# Patient Record
Sex: MALE | Race: WHITE | NOT HISPANIC OR LATINO | URBAN - METROPOLITAN AREA
[De-identification: names, ages, dates, MRNs, and addresses within clinical notes are randomized per-mention and may not be internally consistent; named-entity substitution may affect disease eponyms.]

---

## 2024-01-14 ENCOUNTER — EMERGENCY (EMERGENCY)
Facility: HOSPITAL | Age: 24
LOS: 1 days | Discharge: HOME | End: 2024-01-14
Attending: EMERGENCY MEDICINE | Admitting: EMERGENCY MEDICINE
Payer: COMMERCIAL

## 2024-01-14 VITALS — SYSTOLIC BLOOD PRESSURE: 107 MMHG | HEART RATE: 81 BPM | DIASTOLIC BLOOD PRESSURE: 57 MMHG | RESPIRATION RATE: 16 BRPM

## 2024-01-14 VITALS
SYSTOLIC BLOOD PRESSURE: 127 MMHG | OXYGEN SATURATION: 96 % | RESPIRATION RATE: 18 BRPM | TEMPERATURE: 98 F | WEIGHT: 149.91 LBS | DIASTOLIC BLOOD PRESSURE: 74 MMHG | HEIGHT: 71 IN | HEART RATE: 91 BPM

## 2024-01-14 DIAGNOSIS — R53.83 OTHER FATIGUE: ICD-10-CM

## 2024-01-14 DIAGNOSIS — R25.1 TREMOR, UNSPECIFIED: ICD-10-CM

## 2024-01-14 DIAGNOSIS — Z91.013 ALLERGY TO SEAFOOD: ICD-10-CM

## 2024-01-14 DIAGNOSIS — Z91.010 ALLERGY TO PEANUTS: ICD-10-CM

## 2024-01-14 DIAGNOSIS — R07.89 OTHER CHEST PAIN: ICD-10-CM

## 2024-01-14 LAB
ALBUMIN SERPL ELPH-MCNC: 4.7 G/DL — SIGNIFICANT CHANGE UP (ref 3.3–5)
ALBUMIN SERPL ELPH-MCNC: 4.7 G/DL — SIGNIFICANT CHANGE UP (ref 3.3–5)
ALP SERPL-CCNC: 52 U/L — SIGNIFICANT CHANGE UP (ref 40–120)
ALP SERPL-CCNC: 52 U/L — SIGNIFICANT CHANGE UP (ref 40–120)
ALT FLD-CCNC: 45 U/L — SIGNIFICANT CHANGE UP (ref 10–45)
ALT FLD-CCNC: 45 U/L — SIGNIFICANT CHANGE UP (ref 10–45)
ANION GAP SERPL CALC-SCNC: 0 MMOL/L — LOW (ref 5–17)
ANION GAP SERPL CALC-SCNC: 0 MMOL/L — LOW (ref 5–17)
AST SERPL-CCNC: 34 U/L — SIGNIFICANT CHANGE UP (ref 10–40)
AST SERPL-CCNC: 34 U/L — SIGNIFICANT CHANGE UP (ref 10–40)
BASOPHILS # BLD AUTO: 0.05 K/UL — SIGNIFICANT CHANGE UP (ref 0–0.2)
BASOPHILS # BLD AUTO: 0.05 K/UL — SIGNIFICANT CHANGE UP (ref 0–0.2)
BASOPHILS NFR BLD AUTO: 0.5 % — SIGNIFICANT CHANGE UP (ref 0–2)
BASOPHILS NFR BLD AUTO: 0.5 % — SIGNIFICANT CHANGE UP (ref 0–2)
BILIRUB SERPL-MCNC: 0.3 MG/DL — SIGNIFICANT CHANGE UP (ref 0.2–1.2)
BILIRUB SERPL-MCNC: 0.3 MG/DL — SIGNIFICANT CHANGE UP (ref 0.2–1.2)
BUN SERPL-MCNC: 18 MG/DL — SIGNIFICANT CHANGE UP (ref 7–23)
BUN SERPL-MCNC: 18 MG/DL — SIGNIFICANT CHANGE UP (ref 7–23)
CALCIUM SERPL-MCNC: 10.1 MG/DL — SIGNIFICANT CHANGE UP (ref 8.4–10.5)
CALCIUM SERPL-MCNC: 10.1 MG/DL — SIGNIFICANT CHANGE UP (ref 8.4–10.5)
CHLORIDE SERPL-SCNC: 112 MMOL/L — HIGH (ref 96–108)
CHLORIDE SERPL-SCNC: 112 MMOL/L — HIGH (ref 96–108)
CK MB CFR SERPL CALC: 2.2 NG/ML — SIGNIFICANT CHANGE UP (ref 0–6.7)
CK MB CFR SERPL CALC: 2.2 NG/ML — SIGNIFICANT CHANGE UP (ref 0–6.7)
CK SERPL-CCNC: 247 U/L — HIGH (ref 30–200)
CK SERPL-CCNC: 247 U/L — HIGH (ref 30–200)
CO2 SERPL-SCNC: 23 MMOL/L — SIGNIFICANT CHANGE UP (ref 22–31)
CO2 SERPL-SCNC: 23 MMOL/L — SIGNIFICANT CHANGE UP (ref 22–31)
CREAT SERPL-MCNC: 1.05 MG/DL — SIGNIFICANT CHANGE UP (ref 0.5–1.3)
CREAT SERPL-MCNC: 1.05 MG/DL — SIGNIFICANT CHANGE UP (ref 0.5–1.3)
EGFR: 102 ML/MIN/1.73M2 — SIGNIFICANT CHANGE UP
EGFR: 102 ML/MIN/1.73M2 — SIGNIFICANT CHANGE UP
EOSINOPHIL # BLD AUTO: 0.12 K/UL — SIGNIFICANT CHANGE UP (ref 0–0.5)
EOSINOPHIL # BLD AUTO: 0.12 K/UL — SIGNIFICANT CHANGE UP (ref 0–0.5)
EOSINOPHIL NFR BLD AUTO: 1.2 % — SIGNIFICANT CHANGE UP (ref 0–6)
EOSINOPHIL NFR BLD AUTO: 1.2 % — SIGNIFICANT CHANGE UP (ref 0–6)
GLUCOSE SERPL-MCNC: 93 MG/DL — SIGNIFICANT CHANGE UP (ref 70–99)
GLUCOSE SERPL-MCNC: 93 MG/DL — SIGNIFICANT CHANGE UP (ref 70–99)
HCT VFR BLD CALC: 39.6 % — SIGNIFICANT CHANGE UP (ref 39–50)
HCT VFR BLD CALC: 39.6 % — SIGNIFICANT CHANGE UP (ref 39–50)
HGB BLD-MCNC: 13.7 G/DL — SIGNIFICANT CHANGE UP (ref 13–17)
HGB BLD-MCNC: 13.7 G/DL — SIGNIFICANT CHANGE UP (ref 13–17)
IMM GRANULOCYTES NFR BLD AUTO: 0.2 % — SIGNIFICANT CHANGE UP (ref 0–0.9)
IMM GRANULOCYTES NFR BLD AUTO: 0.2 % — SIGNIFICANT CHANGE UP (ref 0–0.9)
LYMPHOCYTES # BLD AUTO: 2.73 K/UL — SIGNIFICANT CHANGE UP (ref 1–3.3)
LYMPHOCYTES # BLD AUTO: 2.73 K/UL — SIGNIFICANT CHANGE UP (ref 1–3.3)
LYMPHOCYTES # BLD AUTO: 26.2 % — SIGNIFICANT CHANGE UP (ref 13–44)
LYMPHOCYTES # BLD AUTO: 26.2 % — SIGNIFICANT CHANGE UP (ref 13–44)
MCHC RBC-ENTMCNC: 30.6 PG — SIGNIFICANT CHANGE UP (ref 27–34)
MCHC RBC-ENTMCNC: 30.6 PG — SIGNIFICANT CHANGE UP (ref 27–34)
MCHC RBC-ENTMCNC: 34.6 GM/DL — SIGNIFICANT CHANGE UP (ref 32–36)
MCHC RBC-ENTMCNC: 34.6 GM/DL — SIGNIFICANT CHANGE UP (ref 32–36)
MCV RBC AUTO: 88.6 FL — SIGNIFICANT CHANGE UP (ref 80–100)
MCV RBC AUTO: 88.6 FL — SIGNIFICANT CHANGE UP (ref 80–100)
MONOCYTES # BLD AUTO: 0.72 K/UL — SIGNIFICANT CHANGE UP (ref 0–0.9)
MONOCYTES # BLD AUTO: 0.72 K/UL — SIGNIFICANT CHANGE UP (ref 0–0.9)
MONOCYTES NFR BLD AUTO: 6.9 % — SIGNIFICANT CHANGE UP (ref 2–14)
MONOCYTES NFR BLD AUTO: 6.9 % — SIGNIFICANT CHANGE UP (ref 2–14)
NEUTROPHILS # BLD AUTO: 6.77 K/UL — SIGNIFICANT CHANGE UP (ref 1.8–7.4)
NEUTROPHILS # BLD AUTO: 6.77 K/UL — SIGNIFICANT CHANGE UP (ref 1.8–7.4)
NEUTROPHILS NFR BLD AUTO: 65 % — SIGNIFICANT CHANGE UP (ref 43–77)
NEUTROPHILS NFR BLD AUTO: 65 % — SIGNIFICANT CHANGE UP (ref 43–77)
NRBC # BLD: 0 /100 WBCS — SIGNIFICANT CHANGE UP (ref 0–0)
NRBC # BLD: 0 /100 WBCS — SIGNIFICANT CHANGE UP (ref 0–0)
PLATELET # BLD AUTO: 226 K/UL — SIGNIFICANT CHANGE UP (ref 150–400)
PLATELET # BLD AUTO: 226 K/UL — SIGNIFICANT CHANGE UP (ref 150–400)
POTASSIUM SERPL-MCNC: 3.8 MMOL/L — SIGNIFICANT CHANGE UP (ref 3.5–5.3)
POTASSIUM SERPL-MCNC: 3.8 MMOL/L — SIGNIFICANT CHANGE UP (ref 3.5–5.3)
POTASSIUM SERPL-SCNC: 3.8 MMOL/L — SIGNIFICANT CHANGE UP (ref 3.5–5.3)
POTASSIUM SERPL-SCNC: 3.8 MMOL/L — SIGNIFICANT CHANGE UP (ref 3.5–5.3)
PROT SERPL-MCNC: 7.2 G/DL — SIGNIFICANT CHANGE UP (ref 6–8.3)
PROT SERPL-MCNC: 7.2 G/DL — SIGNIFICANT CHANGE UP (ref 6–8.3)
RBC # BLD: 4.47 M/UL — SIGNIFICANT CHANGE UP (ref 4.2–5.8)
RBC # BLD: 4.47 M/UL — SIGNIFICANT CHANGE UP (ref 4.2–5.8)
RBC # FLD: 12.1 % — SIGNIFICANT CHANGE UP (ref 10.3–14.5)
RBC # FLD: 12.1 % — SIGNIFICANT CHANGE UP (ref 10.3–14.5)
SODIUM SERPL-SCNC: 135 MMOL/L — SIGNIFICANT CHANGE UP (ref 135–145)
SODIUM SERPL-SCNC: 135 MMOL/L — SIGNIFICANT CHANGE UP (ref 135–145)
TROPONIN T, HIGH SENSITIVITY RESULT: 8 NG/L — SIGNIFICANT CHANGE UP (ref 0–51)
WBC # BLD: 10.41 K/UL — SIGNIFICANT CHANGE UP (ref 3.8–10.5)
WBC # BLD: 10.41 K/UL — SIGNIFICANT CHANGE UP (ref 3.8–10.5)
WBC # FLD AUTO: 10.41 K/UL — SIGNIFICANT CHANGE UP (ref 3.8–10.5)
WBC # FLD AUTO: 10.41 K/UL — SIGNIFICANT CHANGE UP (ref 3.8–10.5)

## 2024-01-14 PROCEDURE — 99285 EMERGENCY DEPT VISIT HI MDM: CPT

## 2024-01-14 PROCEDURE — 99284 EMERGENCY DEPT VISIT MOD MDM: CPT | Mod: 25

## 2024-01-14 PROCEDURE — 82550 ASSAY OF CK (CPK): CPT

## 2024-01-14 PROCEDURE — 84484 ASSAY OF TROPONIN QUANT: CPT

## 2024-01-14 PROCEDURE — 85025 COMPLETE CBC W/AUTO DIFF WBC: CPT

## 2024-01-14 PROCEDURE — 80053 COMPREHEN METABOLIC PANEL: CPT

## 2024-01-14 PROCEDURE — 36415 COLL VENOUS BLD VENIPUNCTURE: CPT

## 2024-01-14 PROCEDURE — 71046 X-RAY EXAM CHEST 2 VIEWS: CPT

## 2024-01-14 PROCEDURE — 71046 X-RAY EXAM CHEST 2 VIEWS: CPT | Mod: 26

## 2024-01-14 PROCEDURE — 96374 THER/PROPH/DIAG INJ IV PUSH: CPT

## 2024-01-14 PROCEDURE — 82553 CREATINE MB FRACTION: CPT

## 2024-01-14 RX ORDER — KETOROLAC TROMETHAMINE 30 MG/ML
15 SYRINGE (ML) INJECTION ONCE
Refills: 0 | Status: DISCONTINUED | OUTPATIENT
Start: 2024-01-14 | End: 2024-01-14

## 2024-01-14 RX ORDER — SODIUM CHLORIDE 9 MG/ML
1000 INJECTION INTRAMUSCULAR; INTRAVENOUS; SUBCUTANEOUS ONCE
Refills: 0 | Status: COMPLETED | OUTPATIENT
Start: 2024-01-14 | End: 2024-01-14

## 2024-01-14 RX ADMIN — Medication 15 MILLIGRAM(S): at 00:49

## 2024-01-14 RX ADMIN — SODIUM CHLORIDE 1000 MILLILITER(S): 9 INJECTION INTRAMUSCULAR; INTRAVENOUS; SUBCUTANEOUS at 00:49

## 2024-01-14 NOTE — ED ADULT NURSE NOTE - OBJECTIVE STATEMENT
Pt. presents with chest pressure  Denies a cardiac history/injury  HX of anxiety  Pt. sts. that he feels like it may be anxiety related, but has never felt the physical symptoms associated with it before  He was sent from urgent care

## 2024-01-14 NOTE — ED PROVIDER NOTE - PATIENT PORTAL LINK FT
You can access the FollowMyHealth Patient Portal offered by [facility] by registering at the following website: [email or web address]. By joining [employer]’s FollowMyHealth portal, you will also be able to view your health information using other applications (apps) compatible with our system. You can access the FollowMyHealth Patient Portal offered by [facility] by registering at the following website: [email or web address]. By joining [employer]’s FollowMyHealth portal, you will also be able to view your health information using other applications (apps) compatible with our system.

## 2024-01-14 NOTE — ED PROVIDER NOTE - INTERPRETATION
normal sinus rhythm, Normal axis, Normal ND interval and QRS complex. There are no acute ischemic ST or T-wave changes. normal sinus rhythm, Normal axis, Normal IA interval and QRS complex. There are no acute ischemic ST or T-wave changes.

## 2024-01-14 NOTE — ED ADULT NURSE NOTE - NSFALLUNIVINTERV_ED_ALL_ED
Bed/Stretcher in lowest position, wheels locked, appropriate side rails in place/Call bell, personal items and telephone in reach/Instruct patient to call for assistance before getting out of bed/chair/stretcher/Non-slip footwear applied when patient is off stretcher/[place] to call system/Physically safe environment - no spills, clutter or unnecessary equipment/Purposeful proactive rounding/Room/bathroom lighting operational, light cord in reach Bed/Stretcher in lowest position, wheels locked, appropriate side rails in place/Call bell, personal items and telephone in reach/Instruct patient to call for assistance before getting out of bed/chair/stretcher/Non-slip footwear applied when patient is off stretcher/[place] to call system/Physically safe environment - no spills, clutter or unnecessary equipment/Purposeful proactive rounding/Room/bathroom lighting operational, light cord in reach

## 2024-01-14 NOTE — ED PROVIDER NOTE - OBJECTIVE STATEMENT
23M denies PMH p/w 5-6hrs of midsternal CP. Began after waking up, constant, now much improved. Associated w/ feeling shaky, fatigue. Felt like he was having a panic attack - has had similar episodes in the past but never this physically uncomfortable. Went to  who referred to ED. Still feels fatigued and mild CP. No other systemic symptoms.   Pain not positional or pleuritic.   Denies associated SOB, nausea, vomiting, diarrhea, lightheaded, diaphoresis, palpitations, cough, rhinorrhea, black stool, bloody stool, LE pain, LE swelling, focal weakness/numbness, recent travel/immobilization, abd pain, urinary complaints, f/c. No hormone use. No FMH CAD/clots/sudden death. No known prior cardiac w/u.

## 2024-01-14 NOTE — ED PROVIDER NOTE - PROGRESS NOTE DETAILS
[place]: Cl 112, ,initial trop 8-->8, other labs grossly wnl. CXR wnl. Still has intermittent CP but currently asymptomatic.   Discussed importance of outpt follow up and return precautions. Clinically no indication for further emergent ED workup or hospitalization at this time. Stable for dc, outpt f/u.

## 2024-01-14 NOTE — ED PROVIDER NOTE - NSFOLLOWUPINSTRUCTIONS_ED_ALL_ED_FT
Can take tylenol 650mg every 6hrs as needed for pain.    Can also take motrin 600mg every 6hrs as needed for pain (WARNING: Use may cause stomach issues/problems. Take with food. Prolonged use can also cause kidney issues.).     Follow up with primary doctor within 1-2 days.    Follow up with cardiologist within 1-2 days. Can call [phone] ([facility]) to schedule appointment.     Return to ER for persistent fever/vomit, uncontrolled pain, worsening breathing, worsening lightheaded, focal weakness/numbness.    Nonspecific Chest Pain  Chest pain can be caused by many different conditions. It can be caused by a condition that is life-threatening and requires treatment right away. It can also be caused by something that is not life-threatening. If you have chest pain, it can be hard to know the difference, so it is important to get help right away to make sure that you do not have a serious condition.    Some life-threatening causes of chest pain include:  Heart attack.  A tear in the body's main blood vessel (aortic dissection).  Inflammation around your heart (pericarditis).  A problem in the lungs, such as a blood clot (pulmonary embolism) or a collapsed lung (pneumothorax).    Some non life-threatening causes of chest pain include:  Heartburn.  Anxiety or stress.  Damage to the bones, muscles, and cartilage that make up your chest wall.  Pneumonia or bronchitis.  Shingles infection (varicella-zoster virus).    Chest pain can feel like:  Pain or discomfort on the surface of your chest or deep in your chest.  Crushing, pressure, aching, or squeezing pain.  Burning or tingling.  Dull or sharp pain that is worse when you move, cough, or take a deep breath.  Pain or discomfort that is also felt in your back, neck, jaw, shoulder, or arm, or pain that spreads to any of these areas.  Your chest pain may come and go. It may also be constant. Your health care provider will do lab tests and other studies to find the cause of your pain. Treatment will depend on the cause of your chest pain.    Follow these instructions at home:  Pay attention to any changes in your symptoms. Tell your health care provider about them or any new symptoms. Follow these instructions from your health care provider.    Medicines   Take over-the-counter and prescription medicines only as told by your health care provider.  If you were prescribed an antibiotic, take it as told by your health care provider. Do not stop taking the antibiotic even if you start to feel better.    Lifestyle    Rest as directed by your health care provider.  Do not use any products that contain nicotine or tobacco, such as cigarettes and e-cigarettes. If you need help quitting, ask your health care provider.  Do not drink alcohol.  Make healthy lifestyle choices as recommended. These may include:  Getting regular exercise. Ask your health care provider to suggest some activities that are safe for you.  Eating a heart-healthy diet. This includes plenty of fresh fruits and vegetables, whole grains, low-fat (lean) protein, and low-fat dairy products. A dietitian can help you find healthy eating options.  Maintaining a healthy weight.  Managing any other health conditions you have, such as hypertension or diabetes.  Reducing stress, such as with yoga or relaxation techniques.    General instructions   Avoid any activities that bring on chest pain.  Keep all follow-up visits as told by your health care provider. This is important. This includes visits for any further testing if your chest pain does not go away.    Contact a health care provider if:  Your chest pain does not go away.  You feel depressed.  You have a fever.    Get help right away if:  Your chest pain gets worse.  You have a cough that gets worse, or you cough up blood.  You have severe pain in your abdomen.  You faint.  You have sudden, unexplained chest discomfort.  You have sudden, unexplained discomfort in your arms, back, neck, or jaw.  You have shortness of breath at any time.  You suddenly start to sweat, or your skin gets clammy.  You feel nausea or you vomit.  You suddenly feel lightheaded or dizzy.  You have severe weakness, or unexplained weakness or fatigue.  Your heart begins to beat quickly, or it feels like it is skipping beats. Can take tylenol 650mg every 6hrs as needed for pain.    Can also take motrin 600mg every 6hrs as needed for pain (WARNING: Use may cause stomach issues/problems. Take with food. Prolonged use can also cause kidney issues.).     Follow up with primary doctor within 1-2 days.    Follow up with cardiologist within 1-2 days. Can call [phone] ([facility]) to schedule appointment.     Return to ER for persistent fever/vomit, uncontrolled pain, worsening breathing, worsening lightheaded, focal weakness/numbness.    Nonspecific Chest Pain  Chest pain can be caused by many different conditions. It can be caused by a condition that is life-threatening and requires treatment right away. It can also be caused by something that is not life-threatening. If you have chest pain, it can be hard to know the difference, so it is important to get help right away to make sure that you do not have a serious condition.    Some life-threatening causes of chest pain include:  Heart attack.  A tear in the body's main blood vessel (aortic dissection).  Inflammation around your heart (pericarditis).  A problem in the lungs, such as a blood clot (pulmonary embolism) or a collapsed lung (pneumothorax).    Some non life-threatening causes of chest pain include:  Heartburn.  Anxiety or stress.  Damage to the bones, muscles, and cartilage that make up your chest wall.  Pneumonia or bronchitis.  Shingles infection (varicella-zoster virus).    Chest pain can feel like:  Pain or discomfort on the surface of your chest or deep in your chest.  Crushing, pressure, aching, or squeezing pain.  Burning or tingling.  Dull or sharp pain that is worse when you move, cough, or take a deep breath.  Pain or discomfort that is also felt in your back, neck, jaw, shoulder, or arm, or pain that spreads to any of these areas.  Your chest pain may come and go. It may also be constant. Your health care provider will do lab tests and other studies to find the cause of your pain. Treatment will depend on the cause of your chest pain.    Follow these instructions at home:  Pay attention to any changes in your symptoms. Tell your health care provider about them or any new symptoms. Follow these instructions from your health care provider.    Medicines   Take over-the-counter and prescription medicines only as told by your health care provider.  If you were prescribed an antibiotic, take it as told by your health care provider. Do not stop taking the antibiotic even if you start to feel better.    Lifestyle    Rest as directed by your health care provider.  Do not use any products that contain nicotine or tobacco, such as cigarettes and e-cigarettes. If you need help quitting, ask your health care provider.  Do not drink alcohol.  Make healthy lifestyle choices as recommended. These may include:  Getting regular exercise. Ask your health care provider to suggest some activities that are safe for you.  Eating a heart-healthy diet. This includes plenty of fresh fruits and vegetables, whole grains, low-fat (lean) protein, and low-fat dairy products. A dietitian can help you find healthy eating options.  Maintaining a healthy weight.  Managing any other health conditions you have, such as hypertension or diabetes.  Reducing stress, such as with yoga or relaxation techniques.    General instructions   Avoid any activities that bring on chest pain.  Keep all follow-up visits as told by your health care provider. This is important. This includes visits for any further testing if your chest pain does not go away.    Contact a health care provider if:  Your chest pain does not go away.  You feel depressed.  You have a fever.    Get help right away if:  Your chest pain gets worse.  You have a cough that gets worse, or you cough up blood.  You have severe pain in your abdomen.  You faint.  You have sudden, unexplained chest discomfort.  You have sudden, unexplained discomfort in your arms, back, neck, or jaw.  You have shortness of breath at any time.  You suddenly start to sweat, or your skin gets clammy.  You feel nausea or you vomit.  You suddenly feel lightheaded or dizzy.  You have severe weakness, or unexplained weakness or fatigue.  Your heart begins to beat quickly, or it feels like it is skipping beats.

## 2024-01-14 NOTE — ED ADULT TRIAGE NOTE - CHIEF COMPLAINT QUOTE
chest tightness started about 5 hours ago, went to urgent care and sent here for further evaluation for abnormal ekg, as per pt he's been stressful for the last month

## 2024-01-14 NOTE — ED PROVIDER NOTE - CLINICAL SUMMARY MEDICAL DECISION MAKING FREE TEXT BOX
23M denies PMH p/w 5-6hrs of midsternal CP. Began after waking up, constant, now much improved. Associated w/ feeling shaky, fatigue. Felt like he was having a panic attack - has had similar episodes in the past but never this physically uncomfortable. Went to  who referred to ED. Still feels fatigued and mild CP. No other systemic symptoms.   Vitals wnl, exam as above.   EKG w/ TAMI/IN depression. Clinically not STEMI. Symptoms not c/w pericarditis either.   ddx: Very low suspicion significant cardiac etiology.   Labs, CXR, reassess. 23M denies PMH p/w 5-6hrs of midsternal CP. Began after waking up, constant, now much improved. Associated w/ feeling shaky, fatigue. Felt like he was having a panic attack - has had similar episodes in the past but never this physically uncomfortable. Went to  who referred to ED. Still feels fatigued and mild CP. No other systemic symptoms.   Vitals wnl, exam as above.   EKG w/ TAIM/AK depression. Clinically not STEMI. Symptoms not c/w pericarditis either.   ddx: Very low suspicion significant cardiac etiology.   Labs, CXR, reassess.

## 2025-01-21 ENCOUNTER — EMERGENCY (EMERGENCY)
Facility: HOSPITAL | Age: 25
LOS: 1 days | Discharge: HOME | End: 2025-01-21
Attending: EMERGENCY MEDICINE | Admitting: EMERGENCY MEDICINE
Payer: COMMERCIAL

## 2025-01-21 VITALS
HEART RATE: 76 BPM | HEIGHT: 71 IN | WEIGHT: 149.91 LBS | TEMPERATURE: 98 F | RESPIRATION RATE: 18 BRPM | OXYGEN SATURATION: 98 % | SYSTOLIC BLOOD PRESSURE: 131 MMHG | DIASTOLIC BLOOD PRESSURE: 70 MMHG

## 2025-01-21 DIAGNOSIS — Z91.010 ALLERGY TO PEANUTS: ICD-10-CM

## 2025-01-21 DIAGNOSIS — F17.200 NICOTINE DEPENDENCE, UNSPECIFIED, UNCOMPLICATED: ICD-10-CM

## 2025-01-21 DIAGNOSIS — R06.02 SHORTNESS OF BREATH: ICD-10-CM

## 2025-01-21 DIAGNOSIS — I49.40 UNSPECIFIED PREMATURE DEPOLARIZATION: ICD-10-CM

## 2025-01-21 DIAGNOSIS — J45.909 UNSPECIFIED ASTHMA, UNCOMPLICATED: ICD-10-CM

## 2025-01-21 PROCEDURE — 99284 EMERGENCY DEPT VISIT MOD MDM: CPT

## 2025-01-21 PROCEDURE — 99283 EMERGENCY DEPT VISIT LOW MDM: CPT | Mod: 25

## 2025-01-21 PROCEDURE — 93005 ELECTROCARDIOGRAM TRACING: CPT

## 2025-01-21 PROCEDURE — 71046 X-RAY EXAM CHEST 2 VIEWS: CPT

## 2025-01-21 PROCEDURE — 71046 X-RAY EXAM CHEST 2 VIEWS: CPT | Mod: 26

## 2025-01-21 PROCEDURE — 93010 ELECTROCARDIOGRAM REPORT: CPT

## 2025-01-21 RX ORDER — ALBUTEROL SULFATE 90 UG/1
2 INHALANT RESPIRATORY (INHALATION)
Qty: 1 | Refills: 0
Start: 2025-01-21 | End: 2025-01-27

## 2025-01-21 NOTE — ED ADULT NURSE NOTE - SUICIDE SCREENING DEPRESSION
Negative Acitretin Counseling:  I discussed with the patient the risks of acitretin including but not limited to hair loss, dry lips/skin/eyes, liver damage, hyperlipidemia, depression/suicidal ideation, photosensitivity.  Serious rare side effects can include but are not limited to pancreatitis, pseudotumor cerebri, bony changes, clot formation/stroke/heart attack.  Patient understands that alcohol is contraindicated since it can result in liver toxicity and significantly prolong the elimination of the drug by many years.

## 2025-01-21 NOTE — ED PROVIDER NOTE - WHICH SHOWED
EKG - Benign early repol, NSR  CXR wet read - Interpretation by Dr. Gelacio Blake: The heart appears to be within normal limits in transverse diameter.  No acute infiltrates, effusions or evidence of pulmonary vascular congestion.  The chest wall and surrounding bony structures appear normal.

## 2025-01-21 NOTE — ED PROVIDER NOTE - NSFOLLOWUPCLINICS_GEN_ALL_ED_FT
[facility]  Family Medicine  [address]  [place], [place] [address]  Phone: [phone]  Fax:

## 2025-01-21 NOTE — ED PROVIDER NOTE - RESPIRATORY, MLM
Breath sounds clear and equal bilaterally. no rales, no rhonchi, no wheezes b/l, speaking in long full sentences, no accessory muscle use

## 2025-01-21 NOTE — ED ADULT TRIAGE NOTE - WEIGHT METHOD
What Type Of Note Output Would You Prefer (Optional)?: Bullet Format
How Severe Is Your Skin Lesion?: moderate
Has Your Skin Lesion Been Treated?: not been treated
Is This A New Presentation, Or A Follow-Up?: Skin Lesion
stated

## 2025-01-21 NOTE — ED PROVIDER NOTE - ATTENDING APP SHARED VISIT CONTRIBUTION OF CARE
24 M remote hx asthma p/ for 'lungs checked'.  Occasional feels sob at night.  Same happened last night.  No cough or chest pain, fever, chills or uri symptoms.  No recent travel.  + smoker.  VSS, clear lungs, nl heart sounds.  No le edema or calf ttp.  CXR clear.  Do not suspect cardiac cause or pe.  ? mild asthma.  Plan dc with outpt pulm fu.

## 2025-01-21 NOTE — ED PROVIDER NOTE - PATIENT PORTAL LINK FT
You can access the FollowMyHealth Patient Portal offered by [facility] by registering at the following website: [email or web address]. By joining [employer]’s FollowMyHealth portal, you will also be able to view your health information using other applications (apps) compatible with our system.

## 2025-01-21 NOTE — ED PROVIDER NOTE - OBJECTIVE STATEMENT
The pt is a 23 y/o M, who presents to ED c/o sob x 6 mon. Pt states "I just want my lungs checked", reports intermittent episodes of feeling sob at night time while trying to sleep, self resolve, has been seen at [facility] for same but never f/u w/pmd or pulm, hx of asthma but does not carry mdi, last attack yrs ago. Denies cp, dyspnea, palpitations, dizziness, syncope, leg pain or swelling, recent prolonged travel, fevers, chills, cough.
No

## 2025-01-21 NOTE — ED PROVIDER NOTE - CARE PROVIDER_API CALL
Catalino Segura  Pulmonary Disease  [address] [place] [address], [address]  [place] [address]  Phone: [phone]  Fax: [phone]  Follow Up Time:

## 2025-01-21 NOTE — ED PROVIDER NOTE - CLINICAL SUMMARY MEDICAL DECISION MAKING FREE TEXT BOX
pt w/hx of asthma, last attack many yrs ago, does not carry mdi, c/o episodes of sob at night time when trying to sleep x 6 mon w/o any acute changes in symptoms today. has not f/u w/pmd or pulm, no cp/cough/fever, well appearing, hemodynamically stable, no pe risk factors, perc neg, ekg nsr, cxr clear, ? reactive airway vs anxiety vs asthma. no emergent indication for labs or further imaging at this time. will rx mdi. to f/u w/pmd and pulm. pt understands and agrees w/plan, strict return precautions given

## 2025-01-21 NOTE — ED PROVIDER NOTE - NSFOLLOWUPINSTRUCTIONS_ED_ALL_ED_FT
Shortness of breath  please follow up with pmd and see a pulmonologist  Shortness of breath (dyspnea) means you have trouble breathing and could indicate a medical problem. Causes include lung disease, heart disease, low amount of red blood cells (anemia), poor physical fitness, being overweight, smoking, etc. Your health care provider today may not be able to find a cause for your shortness of breath after your exam. In this case, it is important to have a follow-up exam with your primary care physician as instructed. If medicines were prescribed, take them as directed for the full length of time directed. Refrain from tobacco products.    SEEK IMMEDIATE MEDICAL CARE IF YOU HAVE ANY OF THE FOLLOWING SYMPTOMS: worsening shortness of breath, chest pain, back pain, abdominal pain, fever, coughing up blood, lightheadedness/dizziness.

## 2025-01-21 NOTE — ED ADULT NURSE NOTE - NSFALLUNIVINTERV_ED_ALL_ED
Bed/Stretcher in lowest position, wheels locked, appropriate side rails in place/Call bell, personal items and telephone in reach/Instruct patient to call for assistance before getting out of bed/chair/stretcher/Non-slip footwear applied when patient is off stretcher/[place] to call system/Physically safe environment - no spills, clutter or unnecessary equipment/Purposeful proactive rounding/Room/bathroom lighting operational, light cord in reach

## 2025-01-21 NOTE — ED ADULT NURSE NOTE - OBJECTIVE STATEMENT
Patient is a 24yoM presenting to the ED with sob. Patient is axox3, spont breathing on RA. Patient c/o sob x 6 mthnss, denies chest pain, denies nausea/vomiting.
